# Patient Record
Sex: MALE | Race: BLACK OR AFRICAN AMERICAN | NOT HISPANIC OR LATINO | Employment: PART TIME | URBAN - METROPOLITAN AREA
[De-identification: names, ages, dates, MRNs, and addresses within clinical notes are randomized per-mention and may not be internally consistent; named-entity substitution may affect disease eponyms.]

---

## 2018-09-17 ENCOUNTER — OFFICE VISIT (OUTPATIENT)
Dept: URGENT CARE | Facility: CLINIC | Age: 28
End: 2018-09-17

## 2018-09-17 VITALS
WEIGHT: 130 LBS | HEART RATE: 81 BPM | DIASTOLIC BLOOD PRESSURE: 84 MMHG | HEIGHT: 66 IN | SYSTOLIC BLOOD PRESSURE: 127 MMHG | TEMPERATURE: 99 F | RESPIRATION RATE: 18 BRPM | BODY MASS INDEX: 20.89 KG/M2 | OXYGEN SATURATION: 97 %

## 2018-09-17 DIAGNOSIS — B02.9 HERPES ZOSTER WITHOUT COMPLICATION: Primary | ICD-10-CM

## 2018-09-17 PROCEDURE — 99203 OFFICE O/P NEW LOW 30 MIN: CPT | Mod: S$GLB,,, | Performed by: NURSE PRACTITIONER

## 2018-09-17 RX ORDER — ACYCLOVIR 400 MG/1
400 TABLET ORAL 3 TIMES DAILY
Qty: 30 TABLET | Refills: 0 | Status: SHIPPED | OUTPATIENT
Start: 2018-09-17 | End: 2018-09-27

## 2018-09-17 RX ORDER — AMOXICILLIN 500 MG/1
500 CAPSULE ORAL 2 TIMES DAILY
Refills: 0 | COMMUNITY
Start: 2018-09-14

## 2018-09-17 NOTE — PROGRESS NOTES
"Subjective:       Patient ID: Elijah Nava is a 28 y.o. male.    Vitals:  height is 5' 6" (1.676 m) and weight is 59 kg (130 lb). His oral temperature is 99.2 °F (37.3 °C). His blood pressure is 127/84 and his pulse is 81. His respiration is 18 and oxygen saturation is 97%.     Chief Complaint: Eye Problem (right eye)    Pt presents to clinic with c/o rash to face and right eye swelling. Pt states he was [facility] on FOrday when symptoms started and dx with Amoxicillin for an ear infection. Pt states mild improvement in ear pain with antibiotic. Pt returned to [facility] today and was told it was "out of their scope of practice to diagnose shingles", pt was referred to  for further evaluation.     Pt denies vision changes, only c/o swelling to right upper eye lid x2 days.     Admits to having chicken pox as a child.       Eye Problem    The right eye is affected. This is a new problem. The current episode started in the past 7 days. The problem occurs constantly. The problem has been gradually worsening. There was no injury mechanism. The pain is at a severity of 2/10. The pain is mild. There is no known exposure to pink eye. He does not wear contacts. Associated symptoms include eye redness. Pertinent negatives include no blurred vision, eye discharge, double vision, fever, foreign body sensation, itching, nausea, photophobia, recent URI or vomiting. He has tried nothing for the symptoms. The treatment provided no relief.     Review of Systems   Constitution: Negative for chills and fever.   HENT: Negative for congestion.    Eyes: Positive for pain and redness. Negative for blurred vision, discharge, double vision, itching and photophobia.   Skin: Positive for rash.   Gastrointestinal: Negative for nausea and vomiting.   Neurological: Negative for headaches.   All other systems reviewed and are negative.      Objective:      Physical Exam   Constitutional: He is oriented to person, " place, and time. He appears well-developed and well-nourished. He is cooperative.  Non-toxic appearance. He does not appear ill. No distress.   HENT:   Head: Normocephalic and atraumatic.   Right Ear: Hearing, tympanic membrane, external ear and ear canal normal.   Left Ear: Hearing, tympanic membrane, external ear and ear canal normal.   Nose: Mucosal edema present. No rhinorrhea or nasal deformity. No epistaxis. Right sinus exhibits no maxillary sinus tenderness and no frontal sinus tenderness. Left sinus exhibits no maxillary sinus tenderness and no frontal sinus tenderness.   Mouth/Throat: Uvula is midline, oropharynx is clear and moist and mucous membranes are normal. No trismus in the jaw. Normal dentition. No uvula swelling. No posterior oropharyngeal erythema.   Eyes: EOM and lids are normal. Pupils are equal, round, and reactive to light. Lids are everted and swept, no foreign bodies found. Right eye exhibits no chemosis, no discharge, no exudate and no hordeolum. No foreign body present in the right eye. Left eye exhibits no chemosis, no discharge, no exudate and no hordeolum. No foreign body present in the left eye. Right conjunctiva is injected. Right conjunctiva has no hemorrhage. Left conjunctiva is not injected. Left conjunctiva has no hemorrhage. No scleral icterus.   Slit lamp exam:       The right eye shows fluorescein uptake. The right eye shows no corneal abrasion, no corneal ulcer and no foreign body.   No abnormalities noted on right eye staning / exam.     Neck: Trachea normal, normal range of motion, full passive range of motion without pain and phonation normal. Neck supple.   Cardiovascular: Normal rate, regular rhythm, normal heart sounds, intact distal pulses and normal pulses.   Pulmonary/Chest: Effort normal and breath sounds normal. No respiratory distress.   Abdominal: Soft. Normal appearance and bowel sounds are normal. He exhibits no distension, no pulsatile midline mass and no  mass. There is no tenderness.   Musculoskeletal: Normal range of motion. He exhibits no edema or deformity.   Neurological: He is alert and oriented to person, place, and time. He exhibits normal muscle tone. Coordination normal.   Skin: Skin is warm, dry and intact. He is not diaphoretic. No pallor.   Psychiatric: He has a normal mood and affect. His speech is normal and behavior is normal. Judgment and thought content normal. Cognition and memory are normal.   Nursing note and vitals reviewed.      Assessment:       1. Herpes zoster without complication        Plan:     IT IS IMPORTANT THAT YOU FOLLOW UP WITH AN OPHTHALMOLOGIST TODAY FOR FURTHER EVALUATION OF SHINGLES ON RIGHT SIDE OF FACE/FOREHEAD. YOU WERE GIVEN CONTACT INFO FOR FOLLOW UP.     Herpes zoster without complication    Other orders  -     acyclovir (ZOVIRAX) 400 MG tablet; Take 1 tablet (400 mg total) by mouth 3 (three) times daily. for 10 days  Dispense: 30 tablet; Refill: 0

## 2018-09-17 NOTE — PATIENT INSTRUCTIONS

## 2018-09-17 NOTE — LETTER
September 17, 2018      [facility]  [address] [address]  Phone: [phone]  Fax: [phone]       Patient: Elijah Nava   YOB: 1990  Date of Visit: 09/17/2018    To Whom It May Concern:    Charles Nava  was at [facility] on 09/17/2018. He may return to work/school on 09/21/18 with no restrictions. If you have any questions or concerns, or if I can be of further assistance, please do not hesitate to contact me.    Sincerely,    Nanette Murphy NP

## 2018-09-19 ENCOUNTER — TELEPHONE (OUTPATIENT)
Dept: URGENT CARE | Facility: CLINIC | Age: 28
End: 2018-09-19

## 2018-09-19 NOTE — TELEPHONE ENCOUNTER
"Spoke to pt, states he f/u with [facility] for eye exam, shingles had spread to his eye and they gave hime a "gel". Very thankful for the diagnosis and referral. Very satisfied. Feeling much better, rash drying up. Eye improving.   "

## 2019-07-07 ENCOUNTER — OFFICE VISIT (OUTPATIENT)
Dept: URGENT CARE | Facility: CLINIC | Age: 29
End: 2019-07-07

## 2019-07-07 VITALS
HEIGHT: 66 IN | HEART RATE: 79 BPM | SYSTOLIC BLOOD PRESSURE: 110 MMHG | WEIGHT: 130.06 LBS | DIASTOLIC BLOOD PRESSURE: 71 MMHG | BODY MASS INDEX: 20.9 KG/M2 | RESPIRATION RATE: 20 BRPM | TEMPERATURE: 99 F | OXYGEN SATURATION: 100 %

## 2019-07-07 DIAGNOSIS — K06.8 GINGIVAL BLEEDING: Primary | ICD-10-CM

## 2019-07-07 PROCEDURE — 99203 OFFICE O/P NEW LOW 30 MIN: CPT | Mod: S$GLB,,, | Performed by: SURGERY

## 2019-07-07 PROCEDURE — 99203 PR OFFICE/OUTPT VISIT, NEW, LEVL III, 30-44 MIN: ICD-10-PCS | Mod: S$GLB,,, | Performed by: SURGERY

## 2019-07-07 RX ORDER — CHLORHEXIDINE GLUCONATE ORAL RINSE 1.2 MG/ML
15 SOLUTION DENTAL 2 TIMES DAILY
COMMUNITY

## 2019-07-07 NOTE — PROGRESS NOTES
"Subjective:       Patient ID: Elijah Nava is a 29 y.o. male.    Vitals:  height is 5' 6" (1.676 m) and weight is 59 kg (130 lb 1.1 oz). His temperature is 98.7 °F (37.1 °C). His blood pressure is 110/71 and his pulse is 79. His respiration is 20 and oxygen saturation is 100%.     Chief Complaint: Dental Problem    Patient states he went to the dentist on Friday and got a cleaning and since then there is a spot on his gum that continues to bleed.   He had not had a dental cleaning in 10 years.  It was a fairly aggressive cleaning.  He had bleeding in the gums during and after the procedure.  He noted yesterday on the right upper gums he had bleeding which did not stop.  He did wake up at night spitting a blood clot but not choking.  He has been using oral rinse and also brushing his teeth despite the bleeding.    Dental Injury    This is a new problem. The current episode started in the past 7 days. The problem occurs constantly. The problem has been unchanged. The pain is at a severity of 0/10. The patient is experiencing no pain. Associated symptoms include oral bleeding. Pertinent negatives include no fever. Treatments tried: oral rinse.  The treatment provided no relief.       Constitution: Negative for chills, fatigue and fever.   HENT: Positive for dental problem. Negative for congestion and sore throat.    Neck: Negative for painful lymph nodes.   Cardiovascular: Negative for chest pain and leg swelling.   Eyes: Negative for double vision and blurred vision.   Respiratory: Negative for cough and shortness of breath.    Gastrointestinal: Negative for nausea, vomiting and diarrhea.   Genitourinary: Negative for dysuria, frequency and urgency.   Musculoskeletal: Negative for joint pain, joint swelling, muscle cramps and muscle ache.   Skin: Negative for color change, pale and rash.   Allergic/Immunologic: Negative for seasonal allergies.   Neurological: Negative for dizziness, history of vertigo, " light-headedness, passing out and headaches.   Hematologic/Lymphatic: Negative for swollen lymph nodes, easy bruising/bleeding and history of blood clots. Does not bruise/bleed easily.   Psychiatric/Behavioral: Negative for nervous/anxious, sleep disturbance and depression. The patient is not nervous/anxious.        Objective:      Physical Exam   Constitutional: He is oriented to person, place, and time. He appears well-developed and well-nourished. He is cooperative.  Non-toxic appearance. He does not appear ill. No distress.   HENT:   Head: Normocephalic and atraumatic.   Right Ear: Hearing, tympanic membrane, external ear and ear canal normal.   Left Ear: Hearing, tympanic membrane, external ear and ear canal normal.   Nose: Nose normal. No mucosal edema, rhinorrhea or nasal deformity. No epistaxis. Right sinus exhibits no maxillary sinus tenderness and no frontal sinus tenderness. Left sinus exhibits no maxillary sinus tenderness and no frontal sinus tenderness.   Mouth/Throat: Uvula is midline, oropharynx is clear and moist and mucous membranes are normal. No trismus in the jaw. Abnormal dentition. No uvula swelling. No posterior oropharyngeal erythema.       Eyes: Conjunctivae and lids are normal. No scleral icterus.   Sclera clear bilat   Neck: Trachea normal, full passive range of motion without pain and phonation normal. Neck supple.   Cardiovascular: Normal rate, regular rhythm, normal heart sounds, intact distal pulses and normal pulses.   Pulmonary/Chest: Effort normal and breath sounds normal. No respiratory distress.   Abdominal: Soft. Normal appearance and bowel sounds are normal. He exhibits no distension. There is no tenderness.   Musculoskeletal: Normal range of motion. He exhibits no edema or deformity.   Neurological: He is alert and oriented to person, place, and time. He exhibits normal muscle tone. Coordination normal.   Skin: Skin is warm, dry and intact. He is not diaphoretic. No pallor.    Psychiatric: He has a normal mood and affect. His speech is normal and behavior is normal. Judgment and thought content normal. Cognition and memory are normal.   Nursing note and vitals reviewed.      Assessment:       1. Gingival bleeding        Plan:         Gingival bleeding     The patient was instructed to keep gauze between his cheek and upper tooth with gingival bleeding.  He was instructed not to brush his teeth which would likely worsen the bleeding.  He was instructed to rinse minimally as needed.  And follow up with his dentist in the morning.